# Patient Record
Sex: MALE | Race: WHITE
[De-identification: names, ages, dates, MRNs, and addresses within clinical notes are randomized per-mention and may not be internally consistent; named-entity substitution may affect disease eponyms.]

---

## 2019-12-02 ENCOUNTER — HOSPITAL ENCOUNTER (EMERGENCY)
Dept: HOSPITAL 54 - ER | Age: 19
Discharge: HOME | End: 2019-12-02
Payer: COMMERCIAL

## 2019-12-02 VITALS — WEIGHT: 145 LBS | HEIGHT: 74 IN | BODY MASS INDEX: 18.61 KG/M2

## 2019-12-02 VITALS — DIASTOLIC BLOOD PRESSURE: 78 MMHG | SYSTOLIC BLOOD PRESSURE: 113 MMHG

## 2019-12-02 DIAGNOSIS — J02.0: Primary | ICD-10-CM

## 2019-12-02 NOTE — NUR
PT BIBS. C/O SORETHROAT, PAINFUL SWALLOWING X 2 DAYS. PLACED ON MONITOR AND 
PULSE OX. NO ACUTE DISTRESS.